# Patient Record
Sex: FEMALE | Race: BLACK OR AFRICAN AMERICAN | NOT HISPANIC OR LATINO | Employment: PART TIME | URBAN - METROPOLITAN AREA
[De-identification: names, ages, dates, MRNs, and addresses within clinical notes are randomized per-mention and may not be internally consistent; named-entity substitution may affect disease eponyms.]

---

## 2017-07-10 ENCOUNTER — APPOINTMENT (EMERGENCY)
Dept: CT IMAGING | Facility: HOSPITAL | Age: 22
End: 2017-07-10

## 2017-07-10 ENCOUNTER — HOSPITAL ENCOUNTER (EMERGENCY)
Facility: HOSPITAL | Age: 22
Discharge: HOME | End: 2017-07-10
Attending: EMERGENCY MEDICINE | Admitting: EMERGENCY MEDICINE

## 2017-07-10 VITALS
OXYGEN SATURATION: 96 % | WEIGHT: 180 LBS | DIASTOLIC BLOOD PRESSURE: 79 MMHG | HEART RATE: 78 BPM | TEMPERATURE: 98.2 F | RESPIRATION RATE: 16 BRPM | SYSTOLIC BLOOD PRESSURE: 133 MMHG

## 2017-07-10 DIAGNOSIS — S06.0X9A CONCUSSION: ICD-10-CM

## 2017-07-10 DIAGNOSIS — S09.90XA HEAD INJURY, INITIAL ENCOUNTER: ICD-10-CM

## 2017-07-10 DIAGNOSIS — S00.83XA FACIAL CONTUSION, INITIAL ENCOUNTER: Primary | ICD-10-CM

## 2017-07-10 DIAGNOSIS — S00.83XA FACIAL HEMATOMA, INITIAL ENCOUNTER: ICD-10-CM

## 2017-07-10 PROCEDURE — 70486 CT MAXILLOFACIAL W/O DYE: CPT

## 2017-07-10 PROCEDURE — 70450 CT HEAD/BRAIN W/O DYE: CPT

## 2017-07-10 PROCEDURE — 99283 EMERGENCY DEPT VISIT LOW MDM: CPT

## 2017-07-10 RX ORDER — ACETAMINOPHEN 325 MG/1
975 TABLET ORAL ONCE
Status: COMPLETED | OUTPATIENT
Start: 2017-07-10 | End: 2017-07-10

## 2017-07-10 RX ORDER — PROPARACAINE HYDROCHLORIDE 5 MG/ML
2 SOLUTION/ DROPS OPHTHALMIC ONCE
Status: COMPLETED | OUTPATIENT
Start: 2017-07-10 | End: 2017-07-10

## 2017-07-10 RX ORDER — ONDANSETRON 4 MG/1
4 TABLET, ORALLY DISINTEGRATING ORAL ONCE
Status: COMPLETED | OUTPATIENT
Start: 2017-07-10 | End: 2017-07-10

## 2017-07-10 RX ADMIN — PROPARACAINE HYDROCHLORIDE 2 DROP: 5 SOLUTION/ DROPS OPHTHALMIC at 04:29

## 2017-07-10 RX ADMIN — ACETAMINOPHEN 975 MG: 325 TABLET, FILM COATED ORAL at 02:47

## 2017-07-10 RX ADMIN — FLUORESCEIN SODIUM 1 STRIP: 1 STRIP OPHTHALMIC at 04:30

## 2017-07-10 RX ADMIN — ONDANSETRON 4 MG: 4 TABLET, ORALLY DISINTEGRATING ORAL at 02:47

## 2018-05-26 ENCOUNTER — APPOINTMENT (EMERGENCY)
Dept: RADIOLOGY | Facility: HOSPITAL | Age: 23
End: 2018-05-26

## 2018-05-26 ENCOUNTER — HOSPITAL ENCOUNTER (EMERGENCY)
Facility: HOSPITAL | Age: 23
Discharge: HOME | End: 2018-05-26
Attending: EMERGENCY MEDICINE | Admitting: EMERGENCY MEDICINE

## 2018-05-26 VITALS
TEMPERATURE: 98.8 F | WEIGHT: 195 LBS | HEART RATE: 106 BPM | OXYGEN SATURATION: 98 % | SYSTOLIC BLOOD PRESSURE: 153 MMHG | RESPIRATION RATE: 24 BRPM | DIASTOLIC BLOOD PRESSURE: 73 MMHG

## 2018-05-26 DIAGNOSIS — S01.512A LACERATION OF INTERNAL MOUTH, INITIAL ENCOUNTER: ICD-10-CM

## 2018-05-26 DIAGNOSIS — S09.90XA INJURY OF HEAD, INITIAL ENCOUNTER: ICD-10-CM

## 2018-05-26 DIAGNOSIS — S62.346A NONDISPLACED FRACTURE OF BASE OF FIFTH METACARPAL BONE, RIGHT HAND, INITIAL ENCOUNTER FOR CLOSED FRACTURE: Primary | ICD-10-CM

## 2018-05-26 PROCEDURE — 72125 CT NECK SPINE W/O DYE: CPT

## 2018-05-26 PROCEDURE — 72100 X-RAY EXAM L-S SPINE 2/3 VWS: CPT

## 2018-05-26 PROCEDURE — 72070 X-RAY EXAM THORAC SPINE 2VWS: CPT

## 2018-05-26 PROCEDURE — 73130 X-RAY EXAM OF HAND: CPT

## 2018-05-26 PROCEDURE — 99284 EMERGENCY DEPT VISIT MOD MDM: CPT

## 2018-05-26 PROCEDURE — 70450 CT HEAD/BRAIN W/O DYE: CPT

## 2018-05-26 RX ORDER — HYDROCODONE BITARTRATE AND ACETAMINOPHEN 5; 325 MG/1; MG/1
1 TABLET ORAL EVERY 6 HOURS PRN
Qty: 8 TABLET | Refills: 0 | Status: SHIPPED | OUTPATIENT
Start: 2018-05-26 | End: 2018-06-05

## 2018-05-26 RX ORDER — ACETAMINOPHEN 325 MG/1
650 TABLET ORAL ONCE
Status: DISCONTINUED | OUTPATIENT
Start: 2018-05-26 | End: 2018-05-26 | Stop reason: HOSPADM

## 2018-05-26 RX ORDER — LORAZEPAM 0.5 MG/1
1 TABLET ORAL ONCE
Status: COMPLETED | OUTPATIENT
Start: 2018-05-26 | End: 2018-05-26

## 2018-05-26 RX ADMIN — LORAZEPAM 1 MG: 0.5 TABLET ORAL at 12:10

## 2018-05-26 NOTE — ED PROVIDER NOTES
History  Chief Complaint   Patient presents with    Fall     per ems pt fell down the steps  pt c o R wrist, facial pain, and headache  A 51-year-old female with no significant past medical history; presents with a right-sided headache, mid back pain and right hand pain after a fall  Patient states she was running down steps to answer the door, when she lost her footing causing her to fall down several steps  Patient believes she was about care home down the flight of steps before falling  Patient states she did hit her head and believe she had loss of consciousness for a brief period of time  Currently patient's primary complaint is the right hand pain  Patient does also complain of pain to her right upper teeth  Patient otherwise denies blurred vision, dizziness, lightheadedness, neck pain, chest pain, shortness of breath, abdominal pain, nausea, vomiting, diarrhea, paresthesias and weakness  A/P:  Head injury, back pain and right hand pain s/p fall down steps  Patient with a hematoma to the right temporal region  Also tender in the midline thoracic and upper lumbar spine  Swelling and tenderness is noted to the right fifth metacarpal     Intra-oral laceration is appreciated to the right upper lip  Remainder of exam within normal limits  Patient is neurologically intact, however is hyperventilating and very anxious will obtain imaging to rule out traumatic injury  Intraoral laceration will likely require primary closure with sutures  Offered pain and anxiety medication, however patient denies due to fear of needles and placement of IV  History provided by:  Patient  Fall   Associated symptoms: back pain and headaches        None       History reviewed  No pertinent past medical history  History reviewed  No pertinent surgical history  History reviewed  No pertinent family history  I have reviewed and agree with the history as documented      Social History   Substance Use Topics    Smoking status: Never Smoker    Smokeless tobacco: Never Used    Alcohol use No        Review of Systems   Musculoskeletal: Positive for arthralgias ( right hand) and back pain  Neurological: Positive for headaches  Psychiatric/Behavioral: The patient is nervous/anxious  All other systems reviewed and are negative  Physical Exam  ED Triage Vitals [05/26/18 1058]   Temperature Pulse Respirations Blood Pressure SpO2   98 8 °F (37 1 °C) (!) 106 (!) 24 153/73 98 %      Temp src Heart Rate Source Patient Position - Orthostatic VS BP Location FiO2 (%)   -- Monitor Lying -- --      Pain Score       Worst Possible Pain           Orthostatic Vital Signs  Vitals:    05/26/18 1058   BP: 153/73   Pulse: (!) 106   Patient Position - Orthostatic VS: Lying       Physical Exam  General Appearance: alert and oriented, nad, non toxic appearing  Skin:  Warm, dry, intact  HEENT: normocephalic  Hematoma noted to right eyebrow and temporal region  PERRLA, EOMI  1 cm laceration to the inner right upper lip  Teeth are intact, although tender to touch  Facial bones nontender  Neck: Supple, trachea midline  No midline cervical spine tenderness, cervical collar is in place  Cardiac: RRR; no murmurs, rub, gallops  Pulmonary:  Patient is hyperventilating  Lungs CTAB; no wheezes, rales, rhonchi  Chest wall nontender  Gastrointestinal: abdomen soft, nontender, nondistended; no guarding or rebound tenderness; good bowel sounds, no mass or bruits  Extremities:    Midline thoracic and upper lumbar spinal tenderness, no step-offs or deformities appreciated  No bruising noted to the spine or posterior torso  Right hand tender and swollen over the fifth metacarpal   Decreased range of motion to the R hand and R fingers secondary to pain  Remainder of right upper extremity, left upper extremity and bilateral lower extremities are nontender with full range of motion    No pedal edema, 2+ pulses; no calf tenderness, no clubbing, no cyanosis  Neuro:  no focal motor or sensory deficits, CN 2-12 grossly intact  Psych:    Anxious  Normal affect, normal judgement and insight      ED Medications  Medications   acetaminophen (TYLENOL) tablet 650 mg (not administered)   LORazepam (ATIVAN) tablet 1 mg (1 mg Oral Given 5/26/18 1210)       Diagnostic Studies  Results Reviewed     None                 XR hand 3+ views RIGHT   ED Interpretation by [employer] DO (05/26 1207)   Comminuted fracture at the base of the fifth metacarpal      XR lumbar spine 2 or 3 views   ED Interpretation by [employer] DO (05/26 1207)   No acute fracture or dislocation      XR thoracic spine 2 views   ED Interpretation by [employer] (05/26 1206)   No acute fracture or dislocation      CT cervical spine without contrast   Final Result by Sarthak Mcmillan MD (05/26 5718)      No cervical spine fracture or traumatic malalignment  Workstation performed: [id number]         CT head without contrast   Final Result by Sarthak Mcmillan MD (05/26 0697)      No acute intracranial abnormality  Workstation performed: [id number]               Procedures  Orthopedic Injury  Date/Time: 5/26/2018 1:54 PM  Performed by: Octavia Robles  Authorized by: Octavia Robles   Consent: Verbal consent obtained    Risks and benefits: risks, benefits and alternatives were discussed  Consent given by: patient  Patient identity confirmed: verbally with patient  Injury location: hand  Location details: right hand  Injury type: fracture  Fracture type: fifth metacarpal  Pre-procedure neurovascular assessment: neurovascularly intact  Pre-procedure distal perfusion: normal  Pre-procedure neurological function: normal  Pre-procedure range of motion: reduced  Pre-procedure range of motion comment: secondary to pain  Manipulation performed: no  Immobilization: splint  Splint type: ulnar gutter  Supplies used: Ortho-Glass  Post-procedure neurovascular assessment: post-procedure neurovascularly intact  Post-procedure distal perfusion: normal  Post-procedure neurological function: normal  Post-procedure range of motion: unchanged  Patient tolerance: Patient tolerated the procedure well with no immediate complications            Phone Consults  ED Phone Contact    ED Course  ED Course as of May 26 1415   Sat May 26, 2018   1215 Will discuss with Orthopedics for further management of fracture XR hand 3+ views RIGHT   1215 Updated patient on imaging results  Cervical spine reassessed, and collar removed  Patient has no posterior tenderness and full range of motion without pain  Discussed intraoral laceration with patient, giving options of observation for healing versus primary closure with sutures  Patient opts to let the wound heal without intervention  [id number] Second call placed to [phone] with OR circulating nurse, orthopedic resident currently in surgery  Will review films and call back  1330   Patient updated on the delay  Will give Tylenol for additional pain control  [address] with Danielle Gutierrez ortho resident on call, ortho is not on call for hand surgery  Recommend discussing with Dr Jase Hackett, however pt will likely require splint with outpatient follow up  [address] [facility] with Dr Jase Hackett, he agrees with splinting  Recommends ulnar gutter splint, can leave fingers free due to location of fracture  Will follow up with the patient in the office this week  1354 Pt splinted  Had already ambulated to the bathroom without difficulty  Will proceed with discharge                                   [facility]  CritCare Time    Disposition  Final diagnoses:   Nondisplaced fracture of base of fifth metacarpal bone, right hand, initial encounter for closed fracture   Injury of head, initial encounter   Laceration of internal mouth, initial encounter - upper right inner lip     Time reflects when diagnosis was documented in both MDM as applicable and the Disposition within this note     Time User Action Codes Description Comment    5/26/2018  1:55 PM [place], [address] [address] Nondisplaced fracture of base of fifth metacarpal bone, right hand, initial encounter for closed fracture     5/26/2018  1:55 PM [place], [address] [S09 [id number]] Injury of head, initial encounter     5/26/2018  1:56 PM [place], [address] [P97 [id number]] Laceration of internal mouth, initial encounter     5/26/2018  1:56 PM Prabhakar, [address] [L01 [id number]] Laceration of internal mouth, initial encounter upper right inner lip      ED Disposition     ED Disposition Condition Comment    Discharge  Radha Pa discharge to home/self care  Condition at discharge: Good        Follow-up Information     Follow up With Specialties Details Why Contact Info    Balta Fulton MD Plastic Surgery Schedule an appointment as soon as possible for a visit in 3 days For re-evaluation [address] [place] [address]  Call in 3 days For re-evaluation See sheet provided          Patient's Medications   Discharge Prescriptions    HYDROCODONE-ACETAMINOPHEN (NORCO) 5-325 MG PER TABLET    Take 1 tablet by mouth every 6 (six) hours as needed for pain for up to 10 days Max Daily Amount: 4 tablets       Start Date: 5/26/2018 End Date: 6/5/2018       Order Dose: 1 tablet       Quantity: 8 tablet    Refills: 0     No discharge procedures on file  ED Provider  Attending physically available and evaluated Radha Pa  UDAY managed the patient along with the ED Attending      Electronically Signed by         Victor Hugo Cheney DO  05/26/18 89 Cassie Huang,   05/26/18 1762

## 2018-05-26 NOTE — DISCHARGE INSTRUCTIONS
Leave splint in place until evaluated by orthopedics/hand surgery  Do not get the splint wet, as it will lose the mold  Remove splint immediately and return to the ED if you develop sudden increase in pain, numbness, trouble moving your fingers or your fingers turn white/blue  Call orthopedics/hand surgery (Dr Jozef Bauer) on Tuesday to schedule an appointment for one week  Do not drink alcohol, drive or operate machinery while taking Vicodin, as it can make you drowsy  Follow up with your dentist for re-evaluation of your teeth  If you develop increased swelling, pain or drainage from the lip laceration, return to the ED immediately  Hand Fracture   AMBULATORY CARE:   A hand fracture  is a break in one of the bones in your hand  These include the bones in the wrist and fingers, and those that connect the wrist to the fingers  A hand fracture may be caused by twisting or bending the hand in the wrong way  It may also be caused by a fall, a crush injury, or a sports injury  Common signs and symptoms of a hand fracture:   · Pain or tenderness     · Swelling or bruising     · Problems moving your hand    · Abnormal bump, or abnormal shape of your hand     · Knuckle bone looks sunken in  Seek care immediately if:   · Your have severe pain that does not get better, even with pain medicine  · Your injured hand or forearm is cold, numb, or pale  · Your cast or splint gets wet, damaged, or comes off  · Your arm feels warm, tender, and painful  It may look swollen and red  Contact your healthcare provider if:   · You have new sores around your brace, cast, or splint  · You notice a bad smell coming from under your cast     · You have questions or concerns about your condition or care  Treatment:   · A cast or splint  may be placed on your hand, wrist, and lower arm  It will prevent movement and help your hand heal      · NSAIDs , such as ibuprofen, help decrease swelling, pain, and fever   This medicine is available with or without a doctor's order  NSAIDs can cause stomach bleeding or kidney problems in certain people  If you take blood thinner medicine, always ask your healthcare provider if NSAIDs are safe for you  Always read the medicine label and follow directions  · Acetaminophen  decreases pain and fever  It is available without a doctor's order  Ask how much to take and how often to take it  Follow directions  Acetaminophen can cause liver damage if not taken correctly  · Prescription pain medicine  may be given  Ask how to take this medicine safely  · Closed reduction  may be done to put your bones back into the correct position without surgery  · Open reduction surgery  may be needed to put your bones back into the correct position  This may include the use of special wires, pins, plates or screws  These help keep the broken pieces lined up so your hand can heal correctly  Manage your symptoms:   · Apply ice  on your hand for 15 to 20 minutes every hour or as directed  Use an ice pack, or put crushed ice in a plastic bag  Cover it with a towel before you apply it to your skin  Ice helps prevent tissue damage and decreases swelling and pain  · Elevate  your hand above the level of his or her heart as often as you can  This will help decrease swelling and pain  Prop your hand on pillows or blankets to keep it elevated comfortably  · Go to physical therapy as directed  A physical therapist teaches you exercises to help improve movement and strength and to decrease pain  Follow up with your healthcare provider or hand specialist as directed: You may need to return to have your cast, splint, or stitches removed  Write down your questions so you remember to ask them during your visits  © 2017 [address] [employer] Information is for End User's use only and may not be sold, redistributed or otherwise used for commercial purposes   All illustrations and images included in CareNotes® are the copyrighted property of [employer]  or [address]  The above information is an  only  It is not intended as medical advice for individual conditions or treatments  Talk to your doctor, nurse or pharmacist before following any medical regimen to see if it is safe and effective for you

## 2018-05-26 NOTE — ED ATTENDING ATTESTATION
Gurpreet Infante DO, saw and evaluated the patient  I have discussed the patient with the resident/non-physician practitioner and agree with the resident's/non-physician practitioner's findings, Plan of Care, and MDM as documented in the resident's/non-physician practitioner's note, except where noted  All available labs and Radiology studies were reviewed  At this point I agree with the current assessment done in the Emergency Department  I have conducted an independent evaluation of this patient a history and physical is as follows:    59-year-old female who was running down steps stance the door when she lost her footing and fell down several steps  Patient did get her right hand out to brace her fall and thinks she injured that right hand and wrist as well  Patient states she did strike her head and had a brief loss of consciousness  Patient has a hematoma in the right temporal region  Patient is tender over the metacarpal at the base  There is swelling and tenderness as well over that site  Neurologically intact  There are no focal motor sensory neurological deficits  Will obtain CT scan head cervical spine, x-ray right wrist and thoracolumbar spine  Fracture seen in the base of the 5th metacarpal   Discussed with Hand surgery  Air was splinted, see procedure note  Follow up with Hand surgery, analgesia provided  Discharged with family            Critical Care Time  CritCare Time    Procedures

## 2019-04-01 ENCOUNTER — HOSPITAL ENCOUNTER (EMERGENCY)
Facility: HOSPITAL | Age: 24
Discharge: HOME | End: 2019-04-01
Attending: EMERGENCY MEDICINE | Admitting: EMERGENCY MEDICINE

## 2019-04-01 ENCOUNTER — OFFICE VISIT (OUTPATIENT)
Dept: URGENT CARE | Age: 24
End: 2019-04-01

## 2019-04-01 ENCOUNTER — APPOINTMENT (EMERGENCY)
Dept: RADIOLOGY | Facility: HOSPITAL | Age: 24
End: 2019-04-01

## 2019-04-01 VITALS
SYSTOLIC BLOOD PRESSURE: 147 MMHG | TEMPERATURE: 97.5 F | OXYGEN SATURATION: 98 % | RESPIRATION RATE: 20 BRPM | WEIGHT: 209 LBS | DIASTOLIC BLOOD PRESSURE: 86 MMHG | HEART RATE: 94 BPM

## 2019-04-01 VITALS
SYSTOLIC BLOOD PRESSURE: 149 MMHG | DIASTOLIC BLOOD PRESSURE: 95 MMHG | TEMPERATURE: 97.3 F | OXYGEN SATURATION: 98 % | WEIGHT: 207.23 LBS | HEART RATE: 89 BPM | RESPIRATION RATE: 18 BRPM

## 2019-04-01 DIAGNOSIS — S61.210A LACERATION OF RIGHT INDEX FINGER, FOREIGN BODY PRESENCE UNSPECIFIED, NAIL DAMAGE STATUS UNSPECIFIED, INITIAL ENCOUNTER: Primary | ICD-10-CM

## 2019-04-01 DIAGNOSIS — S61.219A FINGER LACERATION: Primary | ICD-10-CM

## 2019-04-01 DIAGNOSIS — R20.2 PARESTHESIA: ICD-10-CM

## 2019-04-01 PROCEDURE — 90715 TDAP VACCINE 7 YRS/> IM: CPT | Performed by: PHYSICIAN ASSISTANT

## 2019-04-01 PROCEDURE — 90471 IMMUNIZATION ADMIN: CPT

## 2019-04-01 PROCEDURE — 12031 INTMD RPR S/A/T/EXT 2.5 CM/<: CPT | Performed by: PHYSICIAN ASSISTANT

## 2019-04-01 PROCEDURE — 99283 EMERGENCY DEPT VISIT LOW MDM: CPT

## 2019-04-01 PROCEDURE — G0382 LEV 3 HOSP TYPE B ED VISIT: HCPCS | Performed by: NURSE PRACTITIONER

## 2019-04-01 PROCEDURE — 73130 X-RAY EXAM OF HAND: CPT

## 2019-04-01 PROCEDURE — 99283 EMERGENCY DEPT VISIT LOW MDM: CPT | Performed by: PHYSICIAN ASSISTANT

## 2019-04-01 RX ORDER — IBUPROFEN 600 MG/1
600 TABLET ORAL EVERY 6 HOURS PRN
Qty: 30 TABLET | Refills: 0 | Status: SHIPPED | OUTPATIENT
Start: 2019-04-01

## 2019-04-01 RX ORDER — LORAZEPAM 0.5 MG/1
2 TABLET ORAL ONCE
Status: COMPLETED | OUTPATIENT
Start: 2019-04-01 | End: 2019-04-01

## 2019-04-01 RX ORDER — LIDOCAINE HYDROCHLORIDE 10 MG/ML
INJECTION, SOLUTION EPIDURAL; INFILTRATION; INTRACAUDAL; PERINEURAL
Status: COMPLETED
Start: 2019-04-01 | End: 2019-04-01

## 2019-04-01 RX ORDER — LIDOCAINE HYDROCHLORIDE 10 MG/ML
5 INJECTION, SOLUTION EPIDURAL; INFILTRATION; INTRACAUDAL; PERINEURAL ONCE
Status: COMPLETED | OUTPATIENT
Start: 2019-04-01 | End: 2019-04-01

## 2019-04-01 RX ORDER — ACETAMINOPHEN 325 MG/1
650 TABLET ORAL ONCE
Status: COMPLETED | OUTPATIENT
Start: 2019-04-01 | End: 2019-04-01

## 2019-04-01 RX ORDER — CLINDAMYCIN HYDROCHLORIDE 150 MG/1
300 CAPSULE ORAL ONCE
Status: COMPLETED | OUTPATIENT
Start: 2019-04-01 | End: 2019-04-01

## 2019-04-01 RX ORDER — CLINDAMYCIN HYDROCHLORIDE 300 MG/1
300 CAPSULE ORAL EVERY 6 HOURS SCHEDULED
Qty: 40 CAPSULE | Refills: 0 | Status: SHIPPED | OUTPATIENT
Start: 2019-04-01 | End: 2019-04-11

## 2019-04-01 RX ADMIN — ACETAMINOPHEN 650 MG: 325 TABLET ORAL at 12:04

## 2019-04-01 RX ADMIN — LIDOCAINE HYDROCHLORIDE 5 ML: 10 INJECTION, SOLUTION EPIDURAL; INFILTRATION; INTRACAUDAL; PERINEURAL at 11:16

## 2019-04-01 RX ADMIN — LORAZEPAM 2 MG: 0.5 TABLET ORAL at 11:24

## 2019-04-01 RX ADMIN — LIDOCAINE HYDROCHLORIDE: 10 INJECTION, SOLUTION EPIDURAL; INFILTRATION; INTRACAUDAL; PERINEURAL at 11:24

## 2019-04-01 RX ADMIN — TETANUS TOXOID, REDUCED DIPHTHERIA TOXOID AND ACELLULAR PERTUSSIS VACCINE, ADSORBED 0.5 ML: 5; 2.5; 8; 8; 2.5 SUSPENSION INTRAMUSCULAR at 12:04

## 2019-04-01 RX ADMIN — CLINDAMYCIN HYDROCHLORIDE 300 MG: 150 CAPSULE ORAL at 12:05
